# Patient Record
Sex: MALE | Race: WHITE | URBAN - METROPOLITAN AREA
[De-identification: names, ages, dates, MRNs, and addresses within clinical notes are randomized per-mention and may not be internally consistent; named-entity substitution may affect disease eponyms.]

---

## 2021-08-12 ENCOUNTER — OFFICE VISIT (OUTPATIENT)
Dept: URBAN - METROPOLITAN AREA CLINIC 3 | Facility: CLINIC | Age: 61
End: 2021-08-12
Payer: COMMERCIAL

## 2021-08-12 DIAGNOSIS — H43.393 OTHER VITREOUS OPACITIES, BILATERAL: ICD-10-CM

## 2021-08-12 DIAGNOSIS — H25.013 CORTICAL AGE-RELATED CATARACT, BILATERAL: Primary | ICD-10-CM

## 2021-08-12 PROCEDURE — 92014 COMPRE OPH EXAM EST PT 1/>: CPT | Performed by: OPTOMETRIST

## 2021-08-12 RX ORDER — METOPROLOL TARTRATE 37.5 MG/1
37.5 MG TABLET, FILM COATED ORAL
Refills: 0 | Status: ACTIVE
Start: 2021-08-12

## 2021-08-12 RX ORDER — AMITRIPTYLINE HYDROCHLORIDE 25 MG/1
25 MG TABLET, FILM COATED ORAL
Refills: 0 | Status: INACTIVE
Start: 2021-08-12 | End: 2022-02-22

## 2021-08-12 RX ORDER — LOSARTAN POTASSIUM 50 MG/1
50 MG TABLET ORAL
Refills: 0 | Status: ACTIVE
Start: 2021-08-12

## 2021-08-12 RX ORDER — GABAPENTIN 300 MG/1
300 MG CAPSULE ORAL AS DIRECTED
Refills: 0 | Status: ACTIVE
Start: 2021-08-12

## 2021-08-12 RX ORDER — SILDENAFIL 100 MG/1
100 MG TABLET, FILM COATED ORAL
Refills: 0 | Status: INACTIVE
Start: 2021-08-12 | End: 2022-02-22

## 2021-08-12 RX ORDER — CHLORTHALIDONE 25 MG/1
25 MG TABLET ORAL AS DIRECTED
Refills: 0 | Status: ACTIVE
Start: 2021-08-12

## 2021-08-12 RX ORDER — ROSUVASTATIN CALCIUM 20 MG/1
20 MG TABLET, FILM COATED ORAL AS DIRECTED
Refills: 0 | Status: ACTIVE
Start: 2021-08-12

## 2021-08-12 RX ORDER — FENOFIBRATE 160 MG/1
160 MG TABLET ORAL AS DIRECTED
Refills: 0 | Status: ACTIVE
Start: 2021-08-12

## 2021-08-12 RX ORDER — ALPRAZOLAM 1 MG/1
1 MG TABLET ORAL AS DIRECTED
Refills: 0 | Status: INACTIVE
Start: 2021-08-12 | End: 2022-02-22

## 2021-08-12 ASSESSMENT — INTRAOCULAR PRESSURE
OD: 18
OS: 16

## 2021-08-12 NOTE — IMPRESSION/PLAN
Impression: Diabetes mellitus Type 2 without mention of complication: T22.0. Plan: Patient educated regarding pathophysiology of DM and that DM is the leading cause of preventable blindness in adults. Educated patient regarding the importance of good blood sugar control and A1c monitoring. RTC 1 year else PRN.

## 2021-08-12 NOTE — IMPRESSION/PLAN
Impression: Cortical age-related cataract, bilateral: H25.013. Plan: Patient has visually significant cataracts. However due to underlying pathology/condition will defer at this time. RTC in 6 months.

## 2021-08-12 NOTE — IMPRESSION/PLAN
Impression: Other vitreous opacities, bilateral: H43.393. Plan: All signs and risks of retinal detachment and tears were discussed in detail. Patient instructed to call the office immediately if any symptoms noted. Recommend the patient return to office for follow up.

## 2021-08-13 NOTE — IMPRESSION/PLAN
Impression: Cortical age-related cataract, bilateral: H25.013. Plan: Patient called on 08/13/2021 and has decided to proceed with cataract surgery.   Perform OD first

## 2021-08-19 ENCOUNTER — TESTING ONLY (OUTPATIENT)
Dept: URBAN - METROPOLITAN AREA CLINIC 3 | Facility: CLINIC | Age: 61
End: 2021-08-19
Payer: COMMERCIAL

## 2021-08-19 DIAGNOSIS — H25.13 AGE-RELATED NUCLEAR CATARACT, BILATERAL: Primary | ICD-10-CM

## 2021-08-19 PROCEDURE — 92025 CPTRIZED CORNEAL TOPOGRAPHY: CPT | Performed by: OPTOMETRIST

## 2022-02-22 ENCOUNTER — OFFICE VISIT (OUTPATIENT)
Dept: URBAN - METROPOLITAN AREA CLINIC 3 | Facility: CLINIC | Age: 62
End: 2022-02-22
Payer: COMMERCIAL

## 2022-02-22 DIAGNOSIS — H43.313 VITREOUS MEMBRANES AND STRANDS, BILATERAL: ICD-10-CM

## 2022-02-22 DIAGNOSIS — E11.9 DIABETES MELLITUS TYPE 2 WITHOUT MENTION OF COMPLICATION: Primary | ICD-10-CM

## 2022-02-22 PROCEDURE — 99214 OFFICE O/P EST MOD 30 MIN: CPT | Performed by: OPTOMETRIST

## 2022-02-22 ASSESSMENT — INTRAOCULAR PRESSURE
OD: 13
OS: 14

## 2022-02-22 NOTE — IMPRESSION/PLAN
Impression: Diabetes mellitus Type 2 without mention of complication: Z69.1. Plan: Discussed the pathophysiology of diabetes and its effect on the eye. Stressed the importance of strong glucose control. Advised of importance of scheduled dilated examinations, and to contact us immediately for any problems or concerns. Patient was referred to a retina specialist for further evaluation and management.

## 2022-02-22 NOTE — IMPRESSION/PLAN
Impression: Cortical age-related cataract, bilateral: H25.013. Plan: Appears to be visually significant and cataract extraction was offered to the patient, however, patient wishes to wait on surgery.  In case they decide to proceed, plan to perform cataract surgery RIGHT eye first.

## 2023-01-24 ENCOUNTER — OFFICE VISIT (OUTPATIENT)
Dept: URBAN - METROPOLITAN AREA CLINIC 3 | Facility: CLINIC | Age: 63
End: 2023-01-24
Payer: COMMERCIAL

## 2023-01-24 DIAGNOSIS — H25.13 AGE-RELATED NUCLEAR CATARACT, BILATERAL: ICD-10-CM

## 2023-01-24 DIAGNOSIS — E11.9 DIABETES MELLITUS TYPE 2 WITHOUT MENTION OF COMPLICATION: Primary | ICD-10-CM

## 2023-01-24 DIAGNOSIS — H52.13 MYOPIA, BILATERAL: ICD-10-CM

## 2023-01-24 DIAGNOSIS — H43.313 VITREOUS MEMBRANES AND STRANDS, BILATERAL: ICD-10-CM

## 2023-01-24 PROCEDURE — 99214 OFFICE O/P EST MOD 30 MIN: CPT | Performed by: OPTOMETRIST

## 2023-01-24 ASSESSMENT — INTRAOCULAR PRESSURE
OD: 17
OS: 17

## 2023-01-24 ASSESSMENT — VISUAL ACUITY
OD: 20/25
OS: 20/25

## 2023-01-24 NOTE — IMPRESSION/PLAN
Impression: Age-related nuclear cataract, bilateral: H25.13. Plan: Plan to perform cataract surgery in both eyes with the right eye first: Discussed the risks, benefits, and alternatives of cataract surgery. The patient stated a full understanding and a desire to proceed with the procedure. Advised against driving until complete. Reviewed the increased risk of retinal detachment after cataract surgery and reviewed retinal detachment and general warnings and to contact us immediately should any of those develop. Cataracts affecting driving at night due to increased glare/starbursts from oncoming traffic. Patient cannot see television, signs and labels as a result of the cataracts. Patient is candidate/interested standard lens, ORA and laser assisted cataract surgery.

## 2023-01-24 NOTE — IMPRESSION/PLAN
Impression: Diabetes mellitus Type 2 without mention of complication: V70.0. Plan: Discussed the pathophysiology of diabetes and its effect on the eye. Stressed the importance of strong glucose control. Advised of importance of scheduled dilated examinations, and to contact us immediately for any problems or concerns.

## 2024-03-05 ENCOUNTER — OFFICE VISIT (OUTPATIENT)
Dept: URBAN - METROPOLITAN AREA CLINIC 3 | Facility: CLINIC | Age: 64
End: 2024-03-05
Payer: COMMERCIAL

## 2024-03-05 DIAGNOSIS — H25.13 AGE-RELATED NUCLEAR CATARACT, BILATERAL: ICD-10-CM

## 2024-03-05 DIAGNOSIS — E11.9 DIABETES MELLITUS TYPE 2 WITHOUT MENTION OF COMPLICATION: Primary | ICD-10-CM

## 2024-03-05 DIAGNOSIS — H43.313 VITREOUS MEMBRANES AND STRANDS, BILATERAL: ICD-10-CM

## 2024-03-05 DIAGNOSIS — H52.13 MYOPIA, BILATERAL: ICD-10-CM

## 2024-03-05 PROCEDURE — 99214 OFFICE O/P EST MOD 30 MIN: CPT | Performed by: OPTOMETRIST

## 2024-03-05 ASSESSMENT — INTRAOCULAR PRESSURE
OD: 15
OS: 12

## 2024-03-05 ASSESSMENT — VISUAL ACUITY
OS: 20/30
OD: 20/20

## 2025-04-11 ENCOUNTER — OFFICE VISIT (OUTPATIENT)
Dept: URBAN - METROPOLITAN AREA CLINIC 3 | Facility: CLINIC | Age: 65
End: 2025-04-11
Payer: MEDICARE

## 2025-04-11 DIAGNOSIS — H43.313 VITREOUS MEMBRANES AND STRANDS, BILATERAL: ICD-10-CM

## 2025-04-11 DIAGNOSIS — H25.13 AGE-RELATED NUCLEAR CATARACT, BILATERAL: Primary | ICD-10-CM

## 2025-04-11 DIAGNOSIS — E11.9 DIABETES MELLITUS TYPE 2 WITHOUT MENTION OF COMPLICATION: ICD-10-CM

## 2025-04-11 PROCEDURE — 99214 OFFICE O/P EST MOD 30 MIN: CPT | Performed by: OPTOMETRIST

## 2025-04-11 ASSESSMENT — KERATOMETRY
OD: 44.75
OS: 44.25

## 2025-04-11 ASSESSMENT — INTRAOCULAR PRESSURE
OS: 18
OD: 19